# Patient Record
Sex: FEMALE | Race: BLACK OR AFRICAN AMERICAN | URBAN - METROPOLITAN AREA
[De-identification: names, ages, dates, MRNs, and addresses within clinical notes are randomized per-mention and may not be internally consistent; named-entity substitution may affect disease eponyms.]

---

## 2022-04-27 ENCOUNTER — NEW PATIENT (OUTPATIENT)
Dept: URBAN - METROPOLITAN AREA CLINIC 10 | Facility: CLINIC | Age: 47
End: 2022-04-27

## 2022-04-27 DIAGNOSIS — H52.203: ICD-10-CM

## 2022-04-27 PROCEDURE — 92310L CONTACT LENS

## 2022-04-27 PROCEDURE — 92015 DETERMINE REFRACTIVE STATE: CPT

## 2022-04-27 PROCEDURE — 92004 COMPRE OPH EXAM NEW PT 1/>: CPT

## 2022-04-27 ASSESSMENT — TONOMETRY
OS_IOP_MMHG: 16
OD_IOP_MMHG: 15

## 2022-04-27 ASSESSMENT — KERATOMETRY
OS_K1POWER_DIOPTERS: 41.50
OS_K2POWER_DIOPTERS: 43.25
OS_AXISANGLE_DEGREES: 175
OD_K1POWER_DIOPTERS: 41.25
OS_AXISANGLE2_DEGREES: 85
OD_K2POWER_DIOPTERS: 42.50
OD_AXISANGLE2_DEGREES: 100
OD_AXISANGLE_DEGREES: 10

## 2022-04-27 ASSESSMENT — VISUAL ACUITY
OD_SC: 20/200-1
OU_SC: 20/200-1
OS_SC: 20/400

## 2022-04-27 NOTE — PATIENT DISCUSSION
Patient called and did not like new CL OD. Switched back to old Rx in [facility] 6/15/22.

## 2025-02-17 ENCOUNTER — COMPREHENSIVE EXAM (OUTPATIENT)
Age: 50
End: 2025-02-17

## 2025-02-17 DIAGNOSIS — H52.203: ICD-10-CM

## 2025-02-17 PROCEDURE — 92310-3 LEVEL 3 SOFT LENS UPDATE

## 2025-02-17 PROCEDURE — 92015 DETERMINE REFRACTIVE STATE: CPT

## 2025-02-17 PROCEDURE — 92014 COMPRE OPH EXAM EST PT 1/>: CPT
